# Patient Record
Sex: FEMALE | Race: BLACK OR AFRICAN AMERICAN | NOT HISPANIC OR LATINO | Employment: FULL TIME | URBAN - METROPOLITAN AREA
[De-identification: names, ages, dates, MRNs, and addresses within clinical notes are randomized per-mention and may not be internally consistent; named-entity substitution may affect disease eponyms.]

---

## 2023-10-03 ENCOUNTER — HOSPITAL ENCOUNTER (EMERGENCY)
Facility: HOSPITAL | Age: 33
Discharge: LEFT BEFORE DISCHARGE | End: 2023-10-04
Payer: COMMERCIAL

## 2023-10-03 VITALS
WEIGHT: 180 LBS | RESPIRATION RATE: 18 BRPM | BODY MASS INDEX: 28.93 KG/M2 | HEIGHT: 66 IN | OXYGEN SATURATION: 99 % | SYSTOLIC BLOOD PRESSURE: 120 MMHG | HEART RATE: 77 BPM | TEMPERATURE: 100 F | DIASTOLIC BLOOD PRESSURE: 72 MMHG

## 2023-10-03 PROCEDURE — 99281 EMR DPT VST MAYX REQ PHY/QHP: CPT

## 2023-10-04 NOTE — FIRST PROVIDER EVALUATION
"Medical screening examination initiated.  I have conducted a focused provider triage encounter, findings are as follows:    Brief history of present illness:  33 year old female presents to the ER for evaluation of sores to diffuse mouth for "months." Reports painful. Denies any treatment previously     Vitals:    10/03/23 1907   BP: 120/72   BP Location: Left arm   Patient Position: Sitting   Pulse: 77   Resp: 18   Temp: 99.5 °F (37.5 °C)   TempSrc: Oral   SpO2: 99%   Weight: 81.6 kg (180 lb)   Height: 5' 6" (1.676 m)       Pertinent physical exam:  alert, nonlabored    Brief workup plan:  eval    Preliminary workup initiated; this workup will be continued and followed by the physician or advanced practice provider that is assigned to the patient when roomed.  "

## 2024-02-08 PROBLEM — D25.9 FIBROID UTERUS: Status: ACTIVE | Noted: 2024-02-08

## 2024-02-08 PROBLEM — N94.6 DYSMENORRHEA: Status: ACTIVE | Noted: 2024-02-08

## 2024-02-08 PROBLEM — N97.9 FEMALE FERTILITY PROBLEM: Status: ACTIVE | Noted: 2024-02-08

## 2024-02-08 PROBLEM — D64.9 ANEMIA: Status: ACTIVE | Noted: 2024-02-08

## 2024-02-08 PROBLEM — N92.4 EXCESSIVE BLEEDING IN PREMENOPAUSAL PERIOD: Status: ACTIVE | Noted: 2024-02-08

## 2024-02-13 PROBLEM — Z98.890 STATUS POST MYOMECTOMY: Status: ACTIVE | Noted: 2024-02-13
